# Patient Record
Sex: MALE | Race: WHITE | URBAN - METROPOLITAN AREA
[De-identification: names, ages, dates, MRNs, and addresses within clinical notes are randomized per-mention and may not be internally consistent; named-entity substitution may affect disease eponyms.]

---

## 2017-10-14 ENCOUNTER — OFFICE VISIT (OUTPATIENT)
Dept: FAMILY MEDICINE CLINIC | Facility: CLINIC | Age: 27
End: 2017-10-14

## 2017-10-14 ENCOUNTER — LAB ENCOUNTER (OUTPATIENT)
Dept: LAB | Age: 27
End: 2017-10-14
Attending: FAMILY MEDICINE
Payer: COMMERCIAL

## 2017-10-14 VITALS
HEIGHT: 67 IN | WEIGHT: 239 LBS | TEMPERATURE: 98 F | DIASTOLIC BLOOD PRESSURE: 80 MMHG | SYSTOLIC BLOOD PRESSURE: 110 MMHG | HEART RATE: 80 BPM | BODY MASS INDEX: 37.51 KG/M2 | RESPIRATION RATE: 16 BRPM

## 2017-10-14 DIAGNOSIS — M25.9 REDNESS OF JOINT: ICD-10-CM

## 2017-10-14 DIAGNOSIS — R29.898 WARMTH OF JOINT: ICD-10-CM

## 2017-10-14 DIAGNOSIS — M25.522 ELBOW PAIN, LEFT: ICD-10-CM

## 2017-10-14 DIAGNOSIS — M25.522 ELBOW PAIN, LEFT: Primary | ICD-10-CM

## 2017-10-14 PROBLEM — M10.9 ACUTE GOUT OF FOOT: Status: ACTIVE | Noted: 2017-10-14

## 2017-10-14 PROBLEM — Z87.442 PERSONAL HISTORY OF URINARY CALCULI: Status: ACTIVE | Noted: 2017-10-14

## 2017-10-14 PROCEDURE — 36415 COLL VENOUS BLD VENIPUNCTURE: CPT

## 2017-10-14 PROCEDURE — 99214 OFFICE O/P EST MOD 30 MIN: CPT | Performed by: NURSE PRACTITIONER

## 2017-10-14 PROCEDURE — 85025 COMPLETE CBC W/AUTO DIFF WBC: CPT

## 2017-10-14 PROCEDURE — 84550 ASSAY OF BLOOD/URIC ACID: CPT

## 2017-10-14 RX ORDER — NAPROXEN 500 MG/1
500 TABLET ORAL 2 TIMES DAILY WITH MEALS
Qty: 20 TABLET | Refills: 0 | Status: SHIPPED | OUTPATIENT
Start: 2017-10-14 | End: 2018-09-26

## 2017-10-14 NOTE — PATIENT INSTRUCTIONS
Take naproxen as prescribed, take with food. Do not take with ibuprofen, advil, or motrin. Blood work today. Return with Dr. Rosalva Sin if symptoms worsen or if no improvement in 1 week.     Treating Gout Attacks     Raising the joint above the level of y ¨ Certain meats (red meat, processed meat, turkey)  ¨ Organ meats (kidney, liver, sweetbread)  ¨ Shellfish (lobster, crab, shrimp, scallop, mussel)  ¨ Certain fish (anchovy, sardine, herring, mackerel)  · Take any medications prescribed by your healthcare · Sauces and gravies made with meat  · Organ meats (such as liver, kidneys, sweetbreads, and tripe)  · Legumes (such as dried beans, peas)  · Mushrooms, spinach, asparagus, and cauliflower  · Yeast and yeast extract supplements  Foods to try  Some foods ma

## 2017-10-14 NOTE — PROGRESS NOTES
[address]  PROGRESS NOTE  Del Yu is a 32year old male.     Chief Complaint:  Patient presents with:  Elbow Pain: left elbow pain red, hot [id number] has got worse    HPI:   Patient presents to office visit with complaint o SKIN: denies any unusual skin lesions or rashes  HEENT: Denies ear pain, nasal congestion, sore throat, vision changes.   RESPIRATORY: denies shortness of breath with exertion  CARDIOVASCULAR: denies chest pain on exertion  GI: denies abdominal pain and den Return with Dr. Michael De La Fuente if symptoms worsen or if no improvement in 1 week. Treating Gout Attacks     Raising the joint above the level of your heart can help reduce gout symptoms. Gout is a disease that affects the joints.  It is caused by excess ¨ Shellfish (lobster, crab, shrimp, scallop, mussel)  ¨ Certain fish (anchovy, sardine, herring, mackerel)  · Take any medications prescribed by your healthcare provider. · Lose weight if you need to.   · Reduce high fructose corn syrup in meals and drinks · Legumes (such as dried beans, peas)  · Mushrooms, spinach, asparagus, and cauliflower  · Yeast and yeast extract supplements  Foods to try  Some foods may be helpful for people with gout.  You may want to try adding some of the following foods to your die

## 2017-10-17 ENCOUNTER — TELEPHONE (OUTPATIENT)
Dept: FAMILY MEDICINE CLINIC | Facility: CLINIC | Age: 27
End: 2017-10-17

## 2017-10-17 NOTE — TELEPHONE ENCOUNTER
Patient notified of results expressed understanding and thanks. States he is doing ok will, call back if symptoms persist or increase.

## 2017-10-17 NOTE — TELEPHONE ENCOUNTER
----- Message from ANAT Rowland sent at 10/17/2017  7:19 AM CDT -----  Uric acid wnls. CBC essentially normal  Please assess how pt is feeling.

## 2018-01-15 ENCOUNTER — TELEPHONE (OUTPATIENT)
Dept: FAMILY MEDICINE CLINIC | Facility: CLINIC | Age: 28
End: 2018-01-15

## 2018-01-15 NOTE — TELEPHONE ENCOUNTER
uncle just passed away from the flu and patient was around other family members with the  flu - wants to know if he can have a rx sent to pharmacy since he is now expreiencing symptoms or would he need to come in for a visit

## 2018-01-15 NOTE — TELEPHONE ENCOUNTER
Patient states that he is having flu like symptoms. Recommended an appt. or he can come to the walk in clinic here tomorrow morning between 7 am to 8 am but if symptom increase to go to the ER. Patient states that he will come in tomorrow morning.

## 2018-01-26 ENCOUNTER — OFFICE VISIT (OUTPATIENT)
Dept: FAMILY MEDICINE CLINIC | Facility: CLINIC | Age: 28
End: 2018-01-26

## 2018-01-26 VITALS
BODY MASS INDEX: 38.58 KG/M2 | WEIGHT: 245.81 LBS | HEIGHT: 67 IN | SYSTOLIC BLOOD PRESSURE: 136 MMHG | DIASTOLIC BLOOD PRESSURE: 78 MMHG | OXYGEN SATURATION: 99 % | TEMPERATURE: 99 F | RESPIRATION RATE: 18 BRPM | HEART RATE: 78 BPM

## 2018-01-26 DIAGNOSIS — R68.89 INFLUENZA-LIKE SYMPTOMS: Primary | ICD-10-CM

## 2018-01-26 PROCEDURE — 99213 OFFICE O/P EST LOW 20 MIN: CPT | Performed by: NURSE PRACTITIONER

## 2018-01-26 NOTE — PROGRESS NOTES
HPI:    Patient ID: Ching Lopez is a 29year old male. HPI   Monday started with ST still sore. Tuesday night started with congestion. Yesterday started with fever. Takes acetaminophen and this helps some. Has not had the flu shot.    Dad and mo person, place, and time. Skin: Skin is warm and dry. Psychiatric: He has a normal mood and affect. His behavior is normal. Judgment and thought content normal.   Nursing note and vitals reviewed.      01/26/18  0737   BP: 136/78   Pulse: 78   Resp: 18

## 2018-09-26 ENCOUNTER — OFFICE VISIT (OUTPATIENT)
Dept: FAMILY MEDICINE CLINIC | Facility: CLINIC | Age: 28
End: 2018-09-26
Payer: COMMERCIAL

## 2018-09-26 VITALS
BODY MASS INDEX: 38.01 KG/M2 | HEIGHT: 67 IN | RESPIRATION RATE: 18 BRPM | HEART RATE: 80 BPM | TEMPERATURE: 97 F | DIASTOLIC BLOOD PRESSURE: 88 MMHG | SYSTOLIC BLOOD PRESSURE: 120 MMHG | WEIGHT: 242.19 LBS

## 2018-09-26 DIAGNOSIS — F40.243 FEAR OF FLYING: ICD-10-CM

## 2018-09-26 DIAGNOSIS — F41.9 ANXIETY: ICD-10-CM

## 2018-09-26 DIAGNOSIS — Z00.00 PHYSICAL EXAM: Primary | ICD-10-CM

## 2018-09-26 PROCEDURE — 99395 PREV VISIT EST AGE 18-39: CPT | Performed by: FAMILY MEDICINE

## 2018-09-26 RX ORDER — ALPRAZOLAM 0.25 MG/1
0.25 TABLET ORAL NIGHTLY PRN
Qty: 15 TABLET | Refills: 0 | Status: SHIPPED | OUTPATIENT
Start: 2018-09-26 | End: 2019-08-29

## 2018-09-26 NOTE — PROGRESS NOTES
[facility]      HPI:   Mj Oconnell is a 29year old male who presents for an Annual Health Visit. Patient has hx of anxiety and fear of flying. Will be flying to [facility]. In past has used xanax to help. Denies depressed mood. complaints upper or lower extremities  NEURO: no sensory or motor complaint  PSYCHE: See HPI  HEMATOLOGY: denies hx anemia; denies bruising or excessive bleeding  ENDOCRINE: denies excessive thirst or urination; denies unexpected wt gain or wt loss      EX total) by mouth nightly as needed for Sleep. Fear of flying  -     ALPRAZolam (XANAX) 0.25 MG Oral Tab; Take 1 tablet (0.25 mg total) by mouth nightly as needed for Sleep. Patient Instructions   Recommend healthy diet, exercise and weight loss.

## 2018-09-26 NOTE — PATIENT INSTRUCTIONS
Recommend healthy diet, exercise and weight loss. Stop drinking soda. Return to clinic for fasting labs. Use xanax as needed   Consider seeing a counselor if no improvement. Return to clinic if any concern.

## 2019-08-29 ENCOUNTER — OFFICE VISIT (OUTPATIENT)
Dept: FAMILY MEDICINE CLINIC | Facility: CLINIC | Age: 29
End: 2019-08-29
Payer: COMMERCIAL

## 2019-08-29 VITALS
HEIGHT: 67 IN | DIASTOLIC BLOOD PRESSURE: 86 MMHG | TEMPERATURE: 98 F | RESPIRATION RATE: 18 BRPM | SYSTOLIC BLOOD PRESSURE: 112 MMHG | BODY MASS INDEX: 38.92 KG/M2 | WEIGHT: 248 LBS | HEART RATE: 116 BPM | OXYGEN SATURATION: 98 %

## 2019-08-29 DIAGNOSIS — F41.9 ANXIETY: ICD-10-CM

## 2019-08-29 DIAGNOSIS — R74.8 ELEVATED LIVER ENZYMES: ICD-10-CM

## 2019-08-29 DIAGNOSIS — R10.13 EPIGASTRIC ABDOMINAL PAIN: Primary | ICD-10-CM

## 2019-08-29 DIAGNOSIS — F40.243 FEAR OF FLYING: ICD-10-CM

## 2019-08-29 DIAGNOSIS — K76.0 FATTY LIVER: ICD-10-CM

## 2019-08-29 PROCEDURE — 99214 OFFICE O/P EST MOD 30 MIN: CPT | Performed by: FAMILY MEDICINE

## 2019-08-29 RX ORDER — PANTOPRAZOLE SODIUM 40 MG/1
40 TABLET, DELAYED RELEASE ORAL
Qty: 30 TABLET | Refills: 0 | Status: SHIPPED | OUTPATIENT
Start: 2019-08-29 | End: 2019-11-29

## 2019-08-29 RX ORDER — METHOCARBAMOL 750 MG/1
750 TABLET, FILM COATED ORAL
COMMUNITY
End: 2019-11-29

## 2019-08-29 RX ORDER — ALPRAZOLAM 0.25 MG/1
0.25 TABLET ORAL NIGHTLY PRN
Qty: 15 TABLET | Refills: 0 | Status: SHIPPED | OUTPATIENT
Start: 2019-08-29 | End: 2020-03-15

## 2019-08-29 NOTE — PROGRESS NOTES
[facility] [place]  PROGRESS NOTE  Chief Complaint:   Patient presents with:  ER F/U  Abdominal Pain      HPI:   This is a 34year old male presents to clinic for follow-up from ER visit earlier this month due to abdominal pain.   Patient had a l double vision or yellow sclerae. HEENT:  Denies hearing loss, sneezing, congestion, runny nose or sore throat. INTEGUMENTARY:  Denies rashes, itching, skin lesion, or excessive skin dryness.   CARDIOVASCULAR:  Denies chest pain, chest pressure, chest dis NEUROLOGICAL:  No deficit, normal gait, strength and tone, normal reflexes. PSYCHIATRIC: Alert and oriented x 3; affect appropriate, no depressed mood or anxiety      ASSESSMENT AND PLAN:   Prettyshreetrang King was seen today for er f/u and abdominal pain.     Diagnos software.  Please excuse any grammatical errors. Call my office if you have any questions regarding this note.

## 2019-08-29 NOTE — PATIENT INSTRUCTIONS
Symptoms likely due to possible increase acid production or reflux. Recommend smaller portion meals, avoid eating late at night. Recommend healthy diet, exercise and weight loss      Start protonix. Return to clinic if symptoms worse.    Check labs to

## 2019-11-29 ENCOUNTER — APPOINTMENT (OUTPATIENT)
Dept: LAB | Age: 29
End: 2019-11-29
Attending: FAMILY MEDICINE
Payer: OTHER MISCELLANEOUS

## 2019-11-29 ENCOUNTER — OFFICE VISIT (OUTPATIENT)
Dept: FAMILY MEDICINE CLINIC | Facility: CLINIC | Age: 29
End: 2019-11-29
Payer: OTHER MISCELLANEOUS

## 2019-11-29 VITALS
BODY MASS INDEX: 40.46 KG/M2 | HEIGHT: 67 IN | RESPIRATION RATE: 18 BRPM | WEIGHT: 257.81 LBS | TEMPERATURE: 98 F | OXYGEN SATURATION: 97 % | DIASTOLIC BLOOD PRESSURE: 84 MMHG | SYSTOLIC BLOOD PRESSURE: 130 MMHG | HEART RATE: 92 BPM

## 2019-11-29 DIAGNOSIS — M25.511 ACUTE PAIN OF RIGHT SHOULDER: ICD-10-CM

## 2019-11-29 DIAGNOSIS — Z01.818 PREOPERATIVE EXAMINATION: Primary | ICD-10-CM

## 2019-11-29 PROCEDURE — 36415 COLL VENOUS BLD VENIPUNCTURE: CPT | Performed by: FAMILY MEDICINE

## 2019-11-29 PROCEDURE — 80048 BASIC METABOLIC PNL TOTAL CA: CPT | Performed by: FAMILY MEDICINE

## 2019-11-29 PROCEDURE — 85025 COMPLETE CBC W/AUTO DIFF WBC: CPT | Performed by: FAMILY MEDICINE

## 2019-11-29 PROCEDURE — 99214 OFFICE O/P EST MOD 30 MIN: CPT | Performed by: FAMILY MEDICINE

## 2019-11-29 NOTE — PATIENT INSTRUCTIONS
After today's assessment  patient is at optimum health for surgery and relatively at low risk. There are no contraindication for procedure. Labs done today.

## 2019-11-29 NOTE — PROGRESS NOTES
[facility]  PRE-OP [facility]    Chief Complaint:   Patient presents with:  Pre-Op Exam      HPI:   Mehreen Escobar is a 34year old male with a hx of R shoulder pain, who presents for a pre-operative physical exam. Patient is to have MRI o of breath, wheezing, cough or sputum. GASTROINTESTINAL:  Denies abdominal pain, nausea, vomiting, constipation, diarrhea, or blood in stool. MUSCULOSKELETAL:  Denies weakness, muscle aches, back pain, joint pain, swelling or stiffness.  See HPI  Amber Rouse Soft, nondistended, nontender, bowel sounds normal in all 4 quadrants, no masses, no hepatosplenomegaly. MUSCULOSKELETAL: Normal ROM, no joint pain, or muscle weakness in all extremity. Limited ROM of R shoulder joint due to pain.    BACK: No tenderness, n recognition software.  Please excuse any grammatical errors. Call my office if you have any questions regarding this note.

## 2019-11-30 ENCOUNTER — TELEPHONE (OUTPATIENT)
Dept: FAMILY MEDICINE CLINIC | Facility: CLINIC | Age: 29
End: 2019-11-30

## 2019-11-30 NOTE — TELEPHONE ENCOUNTER
----- Message from Alysia Reina MD sent at 11/30/2019  8:33 AM CST -----  Please inform patient that CBC and BMP is normal.  Also fax preop H&P and labs to [facility] MRI radiology department.

## 2020-01-24 ENCOUNTER — OFFICE VISIT (OUTPATIENT)
Dept: FAMILY MEDICINE CLINIC | Facility: CLINIC | Age: 30
End: 2020-01-24
Payer: COMMERCIAL

## 2020-01-24 VITALS
TEMPERATURE: 99 F | BODY MASS INDEX: 40 KG/M2 | WEIGHT: 256 LBS | DIASTOLIC BLOOD PRESSURE: 102 MMHG | SYSTOLIC BLOOD PRESSURE: 132 MMHG | HEART RATE: 96 BPM | OXYGEN SATURATION: 97 %

## 2020-01-24 DIAGNOSIS — I10 ESSENTIAL HYPERTENSION: Primary | ICD-10-CM

## 2020-01-24 PROCEDURE — 93000 ELECTROCARDIOGRAM COMPLETE: CPT | Performed by: FAMILY MEDICINE

## 2020-01-24 PROCEDURE — 99214 OFFICE O/P EST MOD 30 MIN: CPT | Performed by: FAMILY MEDICINE

## 2020-01-24 RX ORDER — AMOXICILLIN AND CLAVULANATE POTASSIUM 875; 125 MG/1; MG/1
1 TABLET, FILM COATED ORAL
COMMUNITY
Start: 2020-01-20 | End: 2020-01-30

## 2020-01-24 RX ORDER — AMLODIPINE BESYLATE 5 MG/1
5 TABLET ORAL DAILY
Qty: 30 TABLET | Refills: 0 | Status: SHIPPED | OUTPATIENT
Start: 2020-01-24 | End: 2020-05-14 | Stop reason: ALTCHOICE

## 2020-01-24 NOTE — PROGRESS NOTES
[facility] [place]  PROGRESS NOTE  Chief Complaint:   Patient presents with:  Blood Pressure: readings have been high  Urgent Care F/u      HPI:   This is a 27year old male presents to clinic for follow-up on recent urgent care visit.   Patient • ALPRAZolam (XANAX) 0.25 MG Oral Tab Take 1 tablet (0.25 mg total) by mouth nightly as needed for Sleep.  15 tablet 0      Counseling given: Not Answered         REVIEW OF SYSTEMS:   CONSTITUTIONAL:  Denies unusual weight gain/loss, fever, chills, or fat lymphadenopathy, no other lymphadenopathy. MUSCULOSKELETAL: normal ROM, No joint pain, or muscle weakness in all extremity.    PSYCHIATRIC: alert and oriented x 3; affect appropriate          ASSESSMENT AND PLAN:   Ally Nolan was seen today for blood pressure

## 2020-01-24 NOTE — PATIENT INSTRUCTIONS
EKG shows normal sinus rhythm. Blood pressure is elevated today. Recommend to start amlodipine 5 mg daily. Finish the course of antibiotics. Follow-up with me in 1 month for recheck. Advice low salt diet. Monitor your blood pressure.  Return to clinic

## 2020-01-29 ENCOUNTER — TELEPHONE (OUTPATIENT)
Dept: FAMILY MEDICINE CLINIC | Facility: CLINIC | Age: 30
End: 2020-01-29

## 2020-01-29 NOTE — TELEPHONE ENCOUNTER
[facility] of pt's entire medical record. This was sent as Urgent to [employer].  Check # [id number] in the amt of $20 was included with request.

## 2020-02-13 ENCOUNTER — OFFICE VISIT (OUTPATIENT)
Dept: FAMILY MEDICINE CLINIC | Facility: CLINIC | Age: 30
End: 2020-02-13
Payer: COMMERCIAL

## 2020-02-13 ENCOUNTER — TELEPHONE (OUTPATIENT)
Dept: FAMILY MEDICINE CLINIC | Facility: CLINIC | Age: 30
End: 2020-02-13

## 2020-02-13 VITALS
DIASTOLIC BLOOD PRESSURE: 88 MMHG | WEIGHT: 254 LBS | RESPIRATION RATE: 16 BRPM | BODY MASS INDEX: 39.87 KG/M2 | OXYGEN SATURATION: 96 % | SYSTOLIC BLOOD PRESSURE: 126 MMHG | TEMPERATURE: 99 F | HEIGHT: 67 IN | HEART RATE: 82 BPM

## 2020-02-13 DIAGNOSIS — I10 ESSENTIAL HYPERTENSION: ICD-10-CM

## 2020-02-13 DIAGNOSIS — R42 DIZZINESS: Primary | ICD-10-CM

## 2020-02-13 PROCEDURE — 99214 OFFICE O/P EST MOD 30 MIN: CPT | Performed by: NURSE PRACTITIONER

## 2020-02-13 RX ORDER — MECLIZINE HCL 12.5 MG/1
12.5 TABLET ORAL 3 TIMES DAILY PRN
Qty: 9 TABLET | Refills: 0 | Status: SHIPPED | OUTPATIENT
Start: 2020-02-13 | End: 2020-05-14 | Stop reason: ALTCHOICE

## 2020-02-13 NOTE — TELEPHONE ENCOUNTER
Spoke with Brock Rios who patient is seeing this morning. And she wants him to hold his bp medication at this time. Informed patient to bring medication with to the appt. Patient agreed and had no other questions at this time.  Patient denied any SOB or

## 2020-02-13 NOTE — PROGRESS NOTES
[facility] [place]  PROGRESS NOTE  Chief Complaint:   Patient presents with:  Lightheadedness  Dizziness  Lab: wants labs to check thyroid levels and glucose       HPI:   This is a 27year old male with dizziness, \"low\" heart rate.     Patient Problem Relation Age of Onset   • Hypertension Father    • Lipids Father    • Heart Disorder Maternal Grandmother    • Heart Disorder Maternal Grandfather    • Diabetes Paternal Grandfather    • Lipids Paternal Grandfather    • Hypertension Paternal Lonnie Barer /88 (BP Location: Right arm, Patient Position: Standing, Cuff Size: large)   Pulse 82   Temp 98.7 °F (37.1 °C) (Tympanic)   Resp 16   Ht 67\"   Wt 254 lb (115.2 kg)   SpO2 96%   BMI 39.78 kg/m²  Estimated body mass index is 39.78 kg/m² as calculated Orthostatic BPs negative, no dizziness elicited with position changes. Will trial meclizine and nasal corticosteroids, increase oral fluids. Labs to check thyroid and electrolytes, last CBC 11/29/19 with Hgb 17.2; no bleeding nor blood loss per patient. Take this medicine by mouth with a glass of water. Follow the directions on the prescription label. If you are using this medicine to prevent motion sickness, take the dose at least 1 hour before travel.  If it upsets your stomach, take it with food or milk Keep out of the reach of children. Store at room temperature between 15 and 30 degrees C (59 and 86 degrees F). Keep container tightly closed. Throw away any unused medicine after the expiration date.   What should I tell my health care provider before I t Outcome: Patient verbalizes understanding. Patient is notified to call with any questions, complications, allergies, or worsening or changing symptoms. Patient is to call with any side effects or complications from the treatments as a result of today.

## 2020-02-13 NOTE — PATIENT INSTRUCTIONS
Meclizine 12.5mg one tablet every 8 hours as needed for dizziness; may make you drowsy. Do not drive nor operate heavy machinery. Do not take with xanax or other sedating medications nor substances.    Start nasal fluticasone (flonase) one spray each nostr urine  · trouble sleeping  · upset stomach  What may interact with this medicine?   Do not take this medicine with any of the following medications:  · MAOIs like Carbex, Eldepryl, Marplan, Nardil, and Parnate  This medicine may also interact with the follo stand or sit up quickly, especially if you are an older patient. This reduces the risk of dizzy or fainting spells. Alcohol may interfere with the effect of this medicine. Avoid alcoholic drinks. Your mouth may get dry.  Chewing sugarless gum or sucking ha

## 2020-02-13 NOTE — TELEPHONE ENCOUNTER
Patient states he is taking blood pressure medication and he has been having shortness of breath and low heart rate.  States Dr Radha Wilson told him to call the office if he was having any of this symptoms

## 2020-02-13 NOTE — TELEPHONE ENCOUNTER
Patient is calling this morning stating that he's been feeling off lately. Patient states that when he bends over and then stands up feels dizzy. Patient states that he started monitoring his heart rate last night and it was running around 65.  Patient stat

## 2020-02-14 ENCOUNTER — TELEPHONE (OUTPATIENT)
Dept: FAMILY MEDICINE CLINIC | Facility: CLINIC | Age: 30
End: 2020-02-14

## 2020-02-14 LAB
BUN: 12 MG/DL (ref 7–25)
CALCIUM: 10 MG/DL (ref 8.6–10.3)
CARBON DIOXIDE: 24 MMOL/L (ref 20–32)
CHLORIDE: 103 MMOL/L (ref 98–110)
CREATININE: 0.85 MG/DL (ref 0.6–1.35)
EGFR IF AFRICN AM: 136 ML/MIN/1.73M2
EGFR IF NONAFRICN AM: 117 ML/MIN/1.73M2
GLUCOSE: 81 MG/DL (ref 65–139)
POTASSIUM: 3.8 MMOL/L (ref 3.5–5.3)
SODIUM: 138 MMOL/L (ref 135–146)
T4, FREE: 1.8 NG/DL (ref 0.8–1.8)
TSH: 0.99 MIU/L (ref 0.4–4.5)

## 2020-02-14 NOTE — TELEPHONE ENCOUNTER
----- Message from DONATO Gomez sent at 2/14/2020  8:03 AM CST -----  Please notify the patient his labs returned. Electrolytes stable, kidney function stable. Glucose level good, at 81. No sign of diabetes. Thyroid labs in a normal range.

## 2020-03-15 DIAGNOSIS — F40.243 FEAR OF FLYING: ICD-10-CM

## 2020-03-15 DIAGNOSIS — F41.9 ANXIETY: ICD-10-CM

## 2020-03-16 NOTE — TELEPHONE ENCOUNTER
Future appt:    Last Appointment with provider:   1/24/2020  Last appointment at [facility]:  2/13/2020  No results found for: CHOLEST, HDL, LDL, TRIGLY, TRIG  No results found for: EAG, A1C  Lab Results   Component Value Date    T4F 1.8 02/13/2020    TS

## 2020-03-18 RX ORDER — ALPRAZOLAM 0.25 MG/1
0.25 TABLET ORAL NIGHTLY PRN
Qty: 15 TABLET | Refills: 0 | Status: SHIPPED | OUTPATIENT
Start: 2020-03-18 | End: 2020-08-13 | Stop reason: ALTCHOICE

## 2020-03-18 NOTE — TELEPHONE ENCOUNTER
Post Counts include 234 beds at the [facility] ER Visits/PreOp   .     Future appt:    Last Appointment with provider:   1/24/2020  Last appointment at [facility]:  2/13/2020  No results found for: CHOLEST, HDL, LDL, TRIGLY, TRIG  No results found for: EAG, A1C  Lab Results   Compone

## 2020-05-06 ENCOUNTER — TELEPHONE (OUTPATIENT)
Dept: FAMILY MEDICINE CLINIC | Facility: CLINIC | Age: 30
End: 2020-05-06

## 2020-05-06 NOTE — TELEPHONE ENCOUNTER
Appt scheduled.   Patient aware to call from his car to check in upon arrival.     Future Appointments   Date Time Provider Winter Koch   5/19/2020  2:40 PM Duane Saavedra MD [facility] [place] [facility]

## 2020-05-06 NOTE — TELEPHONE ENCOUNTER
needs H&P for rotator cuff surgery on 5/26  Dr Rizzo May       please advise    No future appointments.

## 2020-05-14 ENCOUNTER — HOSPITAL ENCOUNTER (OUTPATIENT)
Dept: GENERAL RADIOLOGY | Age: 30
Discharge: HOME | End: 2020-05-14
Attending: FAMILY MEDICINE
Payer: OTHER MISCELLANEOUS

## 2020-05-14 ENCOUNTER — OFFICE VISIT (OUTPATIENT)
Dept: FAMILY MEDICINE CLINIC | Facility: CLINIC | Age: 30
End: 2020-05-14
Payer: OTHER MISCELLANEOUS

## 2020-05-14 VITALS
TEMPERATURE: 97 F | DIASTOLIC BLOOD PRESSURE: 84 MMHG | SYSTOLIC BLOOD PRESSURE: 122 MMHG | RESPIRATION RATE: 16 BRPM | BODY MASS INDEX: 40.02 KG/M2 | WEIGHT: 255 LBS | HEART RATE: 86 BPM | HEIGHT: 67 IN

## 2020-05-14 DIAGNOSIS — M75.101 TEAR OF RIGHT ROTATOR CUFF, UNSPECIFIED TEAR EXTENT, UNSPECIFIED WHETHER TRAUMATIC: ICD-10-CM

## 2020-05-14 DIAGNOSIS — I10 ESSENTIAL HYPERTENSION: ICD-10-CM

## 2020-05-14 DIAGNOSIS — Z01.818 PREOPERATIVE EXAMINATION: Primary | ICD-10-CM

## 2020-05-14 DIAGNOSIS — Z01.818 PREOPERATIVE EXAMINATION: ICD-10-CM

## 2020-05-14 PROCEDURE — 99214 OFFICE O/P EST MOD 30 MIN: CPT | Performed by: FAMILY MEDICINE

## 2020-05-14 PROCEDURE — 85025 COMPLETE CBC W/AUTO DIFF WBC: CPT | Performed by: FAMILY MEDICINE

## 2020-05-14 PROCEDURE — 93000 ELECTROCARDIOGRAM COMPLETE: CPT | Performed by: FAMILY MEDICINE

## 2020-05-14 PROCEDURE — 80048 BASIC METABOLIC PNL TOTAL CA: CPT | Performed by: FAMILY MEDICINE

## 2020-05-14 PROCEDURE — 71046 X-RAY EXAM CHEST 2 VIEWS: CPT | Performed by: FAMILY MEDICINE

## 2020-05-14 PROCEDURE — 36416 COLLJ CAPILLARY BLOOD SPEC: CPT | Performed by: FAMILY MEDICINE

## 2020-05-14 PROCEDURE — 85730 THROMBOPLASTIN TIME PARTIAL: CPT | Performed by: FAMILY MEDICINE

## 2020-05-14 NOTE — PROGRESS NOTES
[facility]  PRE-[facility]    Chief Complaint:   Patient presents with:  Pre-Op Exam: Pt to right shoulder surgery on 5/26/2020 with Dr. Mary Scherer      HPI:   Vernon Sexton is a 27year old male with a hx of hypertension and tear of right Denies eye pain, visual loss, blurred vision, double vision or yellow sclerae. Ears, Nose, Throat:  Denies hearing loss, sneezing, congestion, runny nose or sore throat. INTEGUMENTARY:  Denies rashes, itching, skin lesion, or excessive skin dryness.   CARD dentition. NECK: Supple, no CLAD, no JVD, no carotid bruit, no thyromegaly. SKIN: No rashes, no skin lesion, no bruising, good turgor. HEART:  Regular rate and rhythm, no murmurs, rubs or gallops.   LUNGS: Clear to auscultation bilterally, no rales/rhonc learning. Medical education done. Outcome: Patient verbalizes understanding. Patient is notified to call with any questions, complications, allergies, or worsening or changing symptoms.   Patient is to call with any side effects or complications from the

## 2020-05-14 NOTE — PATIENT INSTRUCTIONS
EKG shows normal sinus rhythm. After today's assessment  patient is at optimum health for surgery and relatively at low risk. There are no contraindication for procedure. Recommend to avoid any use of aleve, aspirin or ibuprofen 1 week before surgery.

## 2020-05-15 ENCOUNTER — TELEPHONE (OUTPATIENT)
Dept: FAMILY MEDICINE CLINIC | Facility: CLINIC | Age: 30
End: 2020-05-15

## 2020-05-15 NOTE — TELEPHONE ENCOUNTER
Can you please find out from lab why patient's INR/PT is not reported yet. That is only test we are waiting for to send result.

## 2020-05-15 NOTE — TELEPHONE ENCOUNTER
Lab stated that the vial was not filled to the top so they needed a re-draw. Patient is scheduled for Monday with the lab.    Future Appointments   Date Time Provider Winter Koch   5/18/2020  8:00 AM REF [place] [facility]

## 2020-05-15 NOTE — TELEPHONE ENCOUNTER
Future Appointments   Date Time Provider Winter Zee   5/18/2020  8:00 AM REF [place] [facility]     Lab order in chart.

## 2020-05-18 ENCOUNTER — APPOINTMENT (OUTPATIENT)
Dept: LAB | Age: 30
End: 2020-05-18
Attending: FAMILY MEDICINE
Payer: OTHER MISCELLANEOUS

## 2020-05-18 PROCEDURE — 85610 PROTHROMBIN TIME: CPT | Performed by: FAMILY MEDICINE

## 2020-05-18 PROCEDURE — 36415 COLL VENOUS BLD VENIPUNCTURE: CPT | Performed by: FAMILY MEDICINE

## 2020-05-19 ENCOUNTER — TELEPHONE (OUTPATIENT)
Dept: FAMILY MEDICINE CLINIC | Facility: CLINIC | Age: 30
End: 2020-05-19

## 2020-05-19 NOTE — TELEPHONE ENCOUNTER
Please inform patient that chest x-ray and labs are okay. Please fax preop H&P, EKG, labs and x-ray result to surgeon.

## 2020-06-06 ENCOUNTER — PATIENT MESSAGE (OUTPATIENT)
Dept: FAMILY MEDICINE CLINIC | Facility: CLINIC | Age: 30
End: 2020-06-06

## 2020-08-13 ENCOUNTER — OFFICE VISIT (OUTPATIENT)
Dept: FAMILY MEDICINE CLINIC | Facility: CLINIC | Age: 30
End: 2020-08-13
Payer: COMMERCIAL

## 2020-08-13 VITALS
BODY MASS INDEX: 40.97 KG/M2 | DIASTOLIC BLOOD PRESSURE: 85 MMHG | OXYGEN SATURATION: 98 % | HEIGHT: 67 IN | HEART RATE: 94 BPM | RESPIRATION RATE: 18 BRPM | TEMPERATURE: 98 F | WEIGHT: 261 LBS | SYSTOLIC BLOOD PRESSURE: 125 MMHG

## 2020-08-13 DIAGNOSIS — Z13.0 SCREENING FOR DEFICIENCY ANEMIA: ICD-10-CM

## 2020-08-13 DIAGNOSIS — Z00.00 PHYSICAL EXAM: Primary | ICD-10-CM

## 2020-08-13 DIAGNOSIS — Z13.220 LIPID SCREENING: ICD-10-CM

## 2020-08-13 DIAGNOSIS — Z13.1 DIABETES MELLITUS SCREENING: ICD-10-CM

## 2020-08-13 DIAGNOSIS — Z00.00 WELLNESS EXAMINATION: ICD-10-CM

## 2020-08-13 DIAGNOSIS — I10 ESSENTIAL HYPERTENSION: ICD-10-CM

## 2020-08-13 DIAGNOSIS — Z13.29 THYROID DISORDER SCREEN: ICD-10-CM

## 2020-08-13 PROBLEM — Z87.442 PERSONAL HISTORY OF URINARY CALCULI: Status: RESOLVED | Noted: 2017-10-14 | Resolved: 2020-08-13

## 2020-08-13 PROBLEM — M10.9 ACUTE GOUT OF FOOT: Status: RESOLVED | Noted: 2017-10-14 | Resolved: 2020-08-13

## 2020-08-13 PROCEDURE — 99395 PREV VISIT EST AGE 18-39: CPT | Performed by: FAMILY MEDICINE

## 2020-08-13 PROCEDURE — 3079F DIAST BP 80-89 MM HG: CPT | Performed by: FAMILY MEDICINE

## 2020-08-13 PROCEDURE — 3074F SYST BP LT 130 MM HG: CPT | Performed by: FAMILY MEDICINE

## 2020-08-13 PROCEDURE — 3008F BODY MASS INDEX DOCD: CPT | Performed by: FAMILY MEDICINE

## 2020-08-13 NOTE — PATIENT INSTRUCTIONS
Continue current medications  Advice low salt diet and exercise. Monitor your blood pressure. Return to clinic if systolic blood pressure more than 551 or diastolic more than 646. Check labs at [facility].   Recommend weight loss.    Return to clinic if any con

## 2020-08-13 NOTE — PROGRESS NOTES
[address]    Chief Complaint:   Patient presents with:  Physical      HPI:   Ramesh Perrin is a 27year old male who presents for an Annual Health Visit.    Patient has history of hypertension, currently has been using metoprolol throat; hearing loss negative  RESPIRATORY: denies shortness of breath, wheezing or cough   CARDIOVASCULAR: denies chest pain or [place]; no palpitations   GI: denies nausea, vomiting, constipation, diarrhea; no rectal bleeding; no heartburn  GENITAL/: no dy peripheral pulses intact  NEUROLOGIC: Cranial nerves II through XII grossly intact; reflexes normal  PSYCHIATRIC: alert and oriented x 3; affect appropriate        ASSESSMENT AND PLAN:   Genaro Lora was seen today for physical.    Diagnoses and all orders for t

## 2020-08-18 ENCOUNTER — TELEPHONE (OUTPATIENT)
Dept: FAMILY MEDICINE CLINIC | Facility: CLINIC | Age: 30
End: 2020-08-18

## 2020-08-18 DIAGNOSIS — Z11.1 ENCOUNTER FOR TB TINE TEST: ICD-10-CM

## 2020-08-18 DIAGNOSIS — Z23 NEED FOR VACCINATION: Primary | ICD-10-CM

## 2020-08-18 NOTE — TELEPHONE ENCOUNTER
Future appt:    Last Appointment with provider:   8/13/2020  Last appointment at [facility]:  8/13/2020  No results found for: CHOLEST, HDL, LDL, TRIGLY, TRIG  No results found for: EAG, A1C  Lab Results   Component Value Date    T4F 1.8 02/13/2020    TS

## 2020-08-18 NOTE — TELEPHONE ENCOUNTER
Patient states that he needs an appt for a TB test and any other vaccines that he might need- needs orders. Would like a call back to schedule.

## 2020-08-19 NOTE — TELEPHONE ENCOUNTER
Patient is calling stating that he needs a skin TB test for work and a TDAP injection due to having a baby soon. Dr Diez Resides can you please advise.

## 2020-08-19 NOTE — TELEPHONE ENCOUNTER
Let pt know the following below. Pt verbalized his understanding and had no other questions at this time.    Future Appointments   Date Time Provider Winter Koch   8/24/2020  9:15 AM [facility] [place] NURSE [facility] [facility] [place]

## 2020-10-06 ENCOUNTER — OFFICE VISIT (OUTPATIENT)
Dept: FAMILY MEDICINE CLINIC | Facility: CLINIC | Age: 30
End: 2020-10-06
Payer: COMMERCIAL

## 2020-10-06 VITALS
DIASTOLIC BLOOD PRESSURE: 80 MMHG | BODY MASS INDEX: 41.88 KG/M2 | WEIGHT: 266.81 LBS | HEART RATE: 96 BPM | TEMPERATURE: 98 F | RESPIRATION RATE: 16 BRPM | HEIGHT: 67 IN | SYSTOLIC BLOOD PRESSURE: 126 MMHG

## 2020-10-06 DIAGNOSIS — Z23 NEED FOR VACCINATION: ICD-10-CM

## 2020-10-06 DIAGNOSIS — T14.8XXA BRUISE: ICD-10-CM

## 2020-10-06 DIAGNOSIS — I10 ESSENTIAL HYPERTENSION: Primary | ICD-10-CM

## 2020-10-06 PROCEDURE — 90471 IMMUNIZATION ADMIN: CPT | Performed by: FAMILY MEDICINE

## 2020-10-06 PROCEDURE — 3074F SYST BP LT 130 MM HG: CPT | Performed by: FAMILY MEDICINE

## 2020-10-06 PROCEDURE — 90715 TDAP VACCINE 7 YRS/> IM: CPT | Performed by: FAMILY MEDICINE

## 2020-10-06 PROCEDURE — 99213 OFFICE O/P EST LOW 20 MIN: CPT | Performed by: FAMILY MEDICINE

## 2020-10-06 PROCEDURE — 3079F DIAST BP 80-89 MM HG: CPT | Performed by: FAMILY MEDICINE

## 2020-10-06 PROCEDURE — 3008F BODY MASS INDEX DOCD: CPT | Performed by: FAMILY MEDICINE

## 2020-10-06 RX ORDER — ALPRAZOLAM 0.25 MG/1
0.25 TABLET ORAL NIGHTLY PRN
COMMUNITY

## 2020-10-06 NOTE — PATIENT INSTRUCTIONS
Blood pressure stable today. Advice low salt diet and exercise. Monitor your blood pressure. Return to clinic if systolic blood pressure more than 877 or diastolic more than 798. May have mild bruise on abdomen. Monitor bruise.  Return to clinic if a

## 2020-10-06 NOTE — PROGRESS NOTES
[facility] [place]  PROGRESS NOTE  Chief Complaint:   Patient presents with:  Rash Skin Problem: Veins side of Torso/  Needs Boostrix(baby due 12/20)      HPI:   This is a 27year old male with hypertension presents for evaluation of for possibl Medications   Medication Sig Dispense Refill   • ALPRAZolam 0.25 MG Oral Tab Take 0.25 mg by mouth nightly as needed. • metoprolol Tartrate 25 MG Oral Tab Take 25 mg by mouth daily.         Counseling given: Not Answered         REVIEW OF SYSTEMS:   CON and rhythm, S1 and S2 are normal, no murmurs, rubs or gallops. EXTREMITIES: No edema, no cyanosis, no clubbing, FROM, 2+ dorsalis pedis pulses bilaterally.   ABDOMEN: Soft, nondistended, nontender, bowel sounds normal in all 4 quadrants, no Masses, no hepa Fatty liver     Essential hypertension      Danni Neri MD    This note was created utilizing Dragon speech recognition software.  Please excuse any grammatical errors. Call my office if you have any questions regarding this note.

## 2020-11-03 ENCOUNTER — TELEPHONE (OUTPATIENT)
Dept: FAMILY MEDICINE CLINIC | Facility: CLINIC | Age: 30
End: 2020-11-03

## 2020-11-03 DIAGNOSIS — R74.8 ELEVATED LIVER ENZYMES: Primary | ICD-10-CM

## 2020-11-03 DIAGNOSIS — E78.49 OTHER HYPERLIPIDEMIA: ICD-10-CM

## 2020-11-03 NOTE — TELEPHONE ENCOUNTER
Patient received lab results/recommendations and verbalized understanding. He will schedule recommended lab work as directed.

## 2020-11-03 NOTE — TELEPHONE ENCOUNTER
----- Message from DONATO Velasco sent at 11/3/2020  7:47 AM CST -----  Dr. Sybil Temple is out of the office. Results reviewed, please lt patient know blood work is essentially normal wit the exception of lipids.  Cholesterol is elevated, Total cholesterol

## 2020-12-03 ENCOUNTER — TELEPHONE (OUTPATIENT)
Dept: FAMILY MEDICINE CLINIC | Facility: CLINIC | Age: 30
End: 2020-12-03

## 2020-12-03 NOTE — TELEPHONE ENCOUNTER
Pt is going to call [facility] office and check with them to see about thePX requirements- he will also call back to make a lab test for Q-gold blood test.

## 2020-12-07 ENCOUNTER — OFFICE VISIT (OUTPATIENT)
Dept: FAMILY MEDICINE CLINIC | Facility: CLINIC | Age: 30
End: 2020-12-07
Payer: COMMERCIAL

## 2020-12-07 VITALS
TEMPERATURE: 99 F | HEART RATE: 118 BPM | OXYGEN SATURATION: 99 % | RESPIRATION RATE: 16 BRPM | BODY MASS INDEX: 41.75 KG/M2 | SYSTOLIC BLOOD PRESSURE: 122 MMHG | HEIGHT: 67 IN | DIASTOLIC BLOOD PRESSURE: 72 MMHG | WEIGHT: 266 LBS

## 2020-12-07 DIAGNOSIS — Z11.1 ENCOUNTER FOR TB TINE TEST: ICD-10-CM

## 2020-12-07 DIAGNOSIS — I10 ESSENTIAL HYPERTENSION: Primary | ICD-10-CM

## 2020-12-07 DIAGNOSIS — F41.9 MILD ANXIETY: ICD-10-CM

## 2020-12-07 DIAGNOSIS — E78.1 PURE HYPERTRIGLYCERIDEMIA: ICD-10-CM

## 2020-12-07 DIAGNOSIS — Z02.1 ENCOUNTER FOR PRE-EMPLOYMENT EXAMINATION: ICD-10-CM

## 2020-12-07 PROCEDURE — 3008F BODY MASS INDEX DOCD: CPT | Performed by: FAMILY MEDICINE

## 2020-12-07 PROCEDURE — 99214 OFFICE O/P EST MOD 30 MIN: CPT | Performed by: FAMILY MEDICINE

## 2020-12-07 PROCEDURE — 3078F DIAST BP <80 MM HG: CPT | Performed by: FAMILY MEDICINE

## 2020-12-07 PROCEDURE — 3074F SYST BP LT 130 MM HG: CPT | Performed by: FAMILY MEDICINE

## 2020-12-07 NOTE — PATIENT INSTRUCTIONS
Continue current medications  Blood pressure stable. Anxiety stable. Advice low salt diet, weeight and exercise. Monitor your blood pressure. Return to clinic if systolic blood pressure more than 547 or diastolic more than 537.     Tb test at [facility].   W

## 2020-12-07 NOTE — PROGRESS NOTES
[facility]  [facility]  Chief Complaint:   Patient presents with:  Employment Physical: work exam/tb test      HPI:   This is a 27year old male with hypertension, hyper triglyceridemia and mild anxiety presents for follow-up and empl Medication Sig Dispense Refill   • metoprolol Tartrate 25 MG Oral Tab Take 1 tablet (25 mg total) by mouth daily. 90 tablet 1   • ALPRAZolam 0.25 MG Oral Tab Take 0.25 mg by mouth nightly as needed.         Counseling given: Not Answered         REVIEW OF rubs or gallops. EXTREMITIES: No edema, no cyanosis, no clubbing, FROM, 2+ dorsalis pedis pulses bilaterally. ABDOMEN: Soft, nondistended, nontender, bowel sounds normal in all 4 quadrants, no Masses, no hepatosplenomegaly.   SKIN: No rashes, no skin lesi created utilizing Dragon speech recognition software.  Please excuse any grammatical errors. Call my office if you have any questions regarding this note.

## 2020-12-10 ENCOUNTER — TELEPHONE (OUTPATIENT)
Dept: FAMILY MEDICINE CLINIC | Facility: CLINIC | Age: 30
End: 2020-12-10

## 2020-12-10 NOTE — TELEPHONE ENCOUNTER
----- Message from Zac Verde MD sent at 12/10/2020 11:56 AM CST -----  Please inform patient that his QuantiFERON TB Gold test is negative. Also fill out his form.

## 2021-01-26 ENCOUNTER — PATIENT MESSAGE (OUTPATIENT)
Dept: FAMILY MEDICINE CLINIC | Facility: CLINIC | Age: 31
End: 2021-01-26

## 2021-01-27 NOTE — TELEPHONE ENCOUNTER
From: Jarrett Quintanilla  To: Jodi Ramos MD  Sent: 1/26/2021 6:44 PM CST  Subject: Non-Urgent Medical Question    I am slated to receive my first dosage of the covid vaccine Thursday.  I have a cold, sore throat, cough producing mucus, sinus, no fever, l

## 2025-01-06 NOTE — TELEPHONE ENCOUNTER
Consult per Dr. Spaulding - cardiomyopathy,obesity  Is this an oncology patient? N/A  Imaging completed prior to appointment?           PFT:  No results found for this or any previous visit.         Spirometry:  No results found for this or any previous visit.         Overnight Oximetry:  No results found for this or any previous visit.         Chest CT:           Chest Xray: 7/9/2019                                 Echo: TTE 10/1/2024          Swallow Study:         Sleep Study:  No results found for this or any previous visit.                               No results found for this or any previous visit.                                No results found for this or any previous visit.        Pet Scan:  No results found for this or any previous visit.           No specialty comments available.          Were external records obtained NA    Appt. instructions given:    Remind pt to bring a CPAP equipment for downloads.   Obtain CME information if needed.          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   H

## (undated) NOTE — LETTER
09/30/19        [address]  [address]  [place] [id number]      Dear Oc Isabel,    [facility] records indicate that you have outstanding lab work and or testing that was ordered for you and has not yet been completed:  Orders Placed This En

## (undated) NOTE — LETTER
Date: 1/26/2018    Patient Name: Junior Romo          To Whom it may concern: This letter has been written at the patient's request. The above patient was seen at the [facility] for treatment of a medical condition.     This patient

## (undated) NOTE — LETTER
10/26/18        [address]  [address]  [place] [address]      Dear Shweta Gresham,    [facility] records indicate that you have outstanding lab work and or testing that was ordered for you and has not yet been completed:  Orders Placed This En

## (undated) NOTE — MR AVS SNAPSHOT
After Visit Summary   11/29/2019    Nicolasa Oglesby    MRN: [id number]           Visit Information     Date & Time  11/29/2019  2:00 PM Provider  Owen Blair MD Department  [address] [facility] Dept.  Phone  [phone] If you receive a survey from [employer], please take a few minutes to complete it and provide feedback. We strive to deliver the best patient experience and are looking for ways to make improvements. Your feedback will help us do so.  For more infor

## (undated) NOTE — Clinical Note
Please fax H & P to [facility] radiology department.  MRI dept